# Patient Record
Sex: FEMALE | HISPANIC OR LATINO | URBAN - NONMETROPOLITAN AREA
[De-identification: names, ages, dates, MRNs, and addresses within clinical notes are randomized per-mention and may not be internally consistent; named-entity substitution may affect disease eponyms.]

---

## 2019-09-24 ENCOUNTER — OFFICE VISIT (OUTPATIENT)
Dept: URBAN - NONMETROPOLITAN AREA CLINIC 18 | Facility: CLINIC | Age: 44
End: 2019-09-24
Payer: MEDICAID

## 2019-09-24 DIAGNOSIS — R51 HEADACHE: Primary | ICD-10-CM

## 2019-09-24 DIAGNOSIS — H52.4 PRESBYOPIA: ICD-10-CM

## 2019-09-24 PROCEDURE — 92004 COMPRE OPH EXAM NEW PT 1/>: CPT | Performed by: OPTOMETRIST

## 2019-09-24 ASSESSMENT — INTRAOCULAR PRESSURE
OS: 14
OD: 14

## 2019-09-24 ASSESSMENT — VISUAL ACUITY
OS: 20/20
OD: 20/20

## 2019-09-24 NOTE — IMPRESSION/PLAN
Impression: Headache: R51. Plan: Reviewed headache concerns with patient in detail. Recommended follow up with Primary Care Physician if headache continues.